# Patient Record
Sex: MALE | Race: WHITE | Employment: UNEMPLOYED | URBAN - METROPOLITAN AREA
[De-identification: names, ages, dates, MRNs, and addresses within clinical notes are randomized per-mention and may not be internally consistent; named-entity substitution may affect disease eponyms.]

---

## 2017-09-26 PROBLEM — F41.9 ANXIETY: Status: ACTIVE | Noted: 2017-09-26

## 2017-09-26 PROBLEM — E78.5 HLD (HYPERLIPIDEMIA): Status: ACTIVE | Noted: 2017-09-26

## 2017-09-26 PROBLEM — I10 HTN (HYPERTENSION): Status: ACTIVE | Noted: 2017-09-26

## 2017-09-26 PROBLEM — G89.29 CHRONIC PAIN: Status: ACTIVE | Noted: 2017-09-26

## 2017-09-26 PROBLEM — R07.9 CHEST PAIN: Status: ACTIVE | Noted: 2017-09-26

## 2017-09-26 PROBLEM — E44.0 MODERATE MALNUTRITION (HCC): Chronic | Status: ACTIVE | Noted: 2017-09-26

## 2017-09-27 ENCOUNTER — OFFICE VISIT (OUTPATIENT)
Dept: FAMILY MEDICINE CLINIC | Age: 62
End: 2017-09-27

## 2017-09-27 VITALS
RESPIRATION RATE: 14 BRPM | BODY MASS INDEX: 21.53 KG/M2 | HEIGHT: 66 IN | DIASTOLIC BLOOD PRESSURE: 70 MMHG | SYSTOLIC BLOOD PRESSURE: 124 MMHG | OXYGEN SATURATION: 94 % | WEIGHT: 134 LBS | HEART RATE: 68 BPM

## 2017-09-27 DIAGNOSIS — Z12.5 PROSTATE CANCER SCREENING: Primary | ICD-10-CM

## 2017-09-27 DIAGNOSIS — G89.29 OTHER CHRONIC PAIN: ICD-10-CM

## 2017-09-27 DIAGNOSIS — I10 ESSENTIAL HYPERTENSION: ICD-10-CM

## 2017-09-27 DIAGNOSIS — K40.90 LEFT INGUINAL HERNIA: ICD-10-CM

## 2017-09-27 DIAGNOSIS — F41.9 ANXIETY: ICD-10-CM

## 2017-09-27 PROCEDURE — 96160 PT-FOCUSED HLTH RISK ASSMT: CPT | Performed by: FAMILY MEDICINE

## 2017-09-27 PROCEDURE — 99203 OFFICE O/P NEW LOW 30 MIN: CPT | Performed by: FAMILY MEDICINE

## 2017-09-27 ASSESSMENT — PATIENT HEALTH QUESTIONNAIRE - PHQ9
4. FEELING TIRED OR HAVING LITTLE ENERGY: 3
6. FEELING BAD ABOUT YOURSELF - OR THAT YOU ARE A FAILURE OR HAVE LET YOURSELF OR YOUR FAMILY DOWN: 3
5. POOR APPETITE OR OVEREATING: 3
1. LITTLE INTEREST OR PLEASURE IN DOING THINGS: 2
8. MOVING OR SPEAKING SO SLOWLY THAT OTHER PEOPLE COULD HAVE NOTICED. OR THE OPPOSITE, BEING SO FIGETY OR RESTLESS THAT YOU HAVE BEEN MOVING AROUND A LOT MORE THAN USUAL: 0
7. TROUBLE CONCENTRATING ON THINGS, SUCH AS READING THE NEWSPAPER OR WATCHING TELEVISION: 3
9. THOUGHTS THAT YOU WOULD BE BETTER OFF DEAD, OR OF HURTING YOURSELF: 0
3. TROUBLE FALLING OR STAYING ASLEEP: 3
SUM OF ALL RESPONSES TO PHQ9 QUESTIONS 1 & 2: 4
10. IF YOU CHECKED OFF ANY PROBLEMS, HOW DIFFICULT HAVE THESE PROBLEMS MADE IT FOR YOU TO DO YOUR WORK, TAKE CARE OF THINGS AT HOME, OR GET ALONG WITH OTHER PEOPLE: 0
2. FEELING DOWN, DEPRESSED OR HOPELESS: 2
SUM OF ALL RESPONSES TO PHQ QUESTIONS 1-9: 19

## 2017-09-27 ASSESSMENT — ENCOUNTER SYMPTOMS: ABDOMINAL PAIN: 0

## 2017-09-28 DIAGNOSIS — I10 ESSENTIAL HYPERTENSION: ICD-10-CM

## 2017-09-28 DIAGNOSIS — Z12.5 PROSTATE CANCER SCREENING: ICD-10-CM

## 2017-09-28 LAB
A/G RATIO: 1.5 (ref 1.1–2.2)
ALBUMIN SERPL-MCNC: 4.1 G/DL (ref 3.4–5)
ALP BLD-CCNC: 68 U/L (ref 40–129)
ALT SERPL-CCNC: 33 U/L (ref 10–40)
ANION GAP SERPL CALCULATED.3IONS-SCNC: 13 MMOL/L (ref 3–16)
AST SERPL-CCNC: 27 U/L (ref 15–37)
BASOPHILS ABSOLUTE: 0 K/UL (ref 0–0.2)
BASOPHILS RELATIVE PERCENT: 0.4 %
BILIRUB SERPL-MCNC: 0.4 MG/DL (ref 0–1)
BUN BLDV-MCNC: 12 MG/DL (ref 7–20)
CALCIUM SERPL-MCNC: 9.3 MG/DL (ref 8.3–10.6)
CHLORIDE BLD-SCNC: 96 MMOL/L (ref 99–110)
CHOLESTEROL, TOTAL: 186 MG/DL (ref 0–199)
CO2: 28 MMOL/L (ref 21–32)
CREAT SERPL-MCNC: 0.9 MG/DL (ref 0.8–1.3)
EOSINOPHILS ABSOLUTE: 0.3 K/UL (ref 0–0.6)
EOSINOPHILS RELATIVE PERCENT: 3.6 %
GFR AFRICAN AMERICAN: >60
GFR NON-AFRICAN AMERICAN: >60
GLOBULIN: 2.7 G/DL
GLUCOSE BLD-MCNC: 94 MG/DL (ref 70–99)
HCT VFR BLD CALC: 45.8 % (ref 40.5–52.5)
HDLC SERPL-MCNC: 54 MG/DL (ref 40–60)
HEMOGLOBIN: 14.9 G/DL (ref 13.5–17.5)
LDL CHOLESTEROL CALCULATED: 108 MG/DL
LYMPHOCYTES ABSOLUTE: 2 K/UL (ref 1–5.1)
LYMPHOCYTES RELATIVE PERCENT: 25.8 %
MAGNESIUM: 2.2 MG/DL (ref 1.8–2.4)
MCH RBC QN AUTO: 29.5 PG (ref 26–34)
MCHC RBC AUTO-ENTMCNC: 32.4 G/DL (ref 31–36)
MCV RBC AUTO: 91 FL (ref 80–100)
MONOCYTES ABSOLUTE: 1.1 K/UL (ref 0–1.3)
MONOCYTES RELATIVE PERCENT: 13.6 %
NEUTROPHILS ABSOLUTE: 4.4 K/UL (ref 1.7–7.7)
NEUTROPHILS RELATIVE PERCENT: 56.6 %
PDW BLD-RTO: 13.1 % (ref 12.4–15.4)
PLATELET # BLD: 193 K/UL (ref 135–450)
PMV BLD AUTO: 9 FL (ref 5–10.5)
POTASSIUM SERPL-SCNC: 4.5 MMOL/L (ref 3.5–5.1)
PROSTATE SPECIFIC ANTIGEN: 0.58 NG/ML (ref 0–4)
RBC # BLD: 5.04 M/UL (ref 4.2–5.9)
SODIUM BLD-SCNC: 137 MMOL/L (ref 136–145)
T3 FREE: 2.7 PG/ML (ref 2.3–4.2)
T4 FREE: 1 NG/DL (ref 0.9–1.8)
TOTAL PROTEIN: 6.8 G/DL (ref 6.4–8.2)
TRIGL SERPL-MCNC: 118 MG/DL (ref 0–150)
TSH SERPL DL<=0.05 MIU/L-ACNC: 1.77 UIU/ML (ref 0.27–4.2)
VITAMIN D 25-HYDROXY: 37.4 NG/ML
VLDLC SERPL CALC-MCNC: 24 MG/DL
WBC # BLD: 7.8 K/UL (ref 4–11)

## 2017-10-03 ENCOUNTER — SURG/PROC ORDERS (OUTPATIENT)
Dept: SURGERY | Age: 62
End: 2017-10-03

## 2017-10-03 ENCOUNTER — INITIAL CONSULT (OUTPATIENT)
Dept: SURGERY | Age: 62
End: 2017-10-03

## 2017-10-03 VITALS
DIASTOLIC BLOOD PRESSURE: 86 MMHG | HEIGHT: 66 IN | BODY MASS INDEX: 21.69 KG/M2 | WEIGHT: 135 LBS | SYSTOLIC BLOOD PRESSURE: 132 MMHG

## 2017-10-03 DIAGNOSIS — K40.20 BILATERAL INGUINAL HERNIA WITHOUT OBSTRUCTION OR GANGRENE, RECURRENCE NOT SPECIFIED: Primary | ICD-10-CM

## 2017-10-03 PROCEDURE — 99242 OFF/OP CONSLTJ NEW/EST SF 20: CPT | Performed by: SURGERY

## 2017-10-03 RX ORDER — HEPARIN SODIUM 5000 [USP'U]/ML
5000 INJECTION, SOLUTION INTRAVENOUS; SUBCUTANEOUS ONCE
Status: CANCELLED | OUTPATIENT
Start: 2017-10-03

## 2017-10-03 RX ORDER — SODIUM CHLORIDE 0.9 % (FLUSH) 0.9 %
10 SYRINGE (ML) INJECTION EVERY 12 HOURS SCHEDULED
Status: CANCELLED | OUTPATIENT
Start: 2017-10-03

## 2017-10-03 RX ORDER — SODIUM CHLORIDE 0.9 % (FLUSH) 0.9 %
10 SYRINGE (ML) INJECTION PRN
Status: CANCELLED | OUTPATIENT
Start: 2017-10-03

## 2017-10-03 NOTE — MR AVS SNAPSHOT
After Visit Summary             Helen Cooley   10/3/2017 10:15 AM   Initial consult    Description:  Male : 1955   Provider:  Bon Rodriguez MD   Department:  [facility]              Your Follow-Up and Future Appointments         Below is a list of your follow-up and future appointments. This may not be a complete list as you may have made appointments directly with providers that we are not aware of or your providers may have made some for you. Please call your providers to confirm appointments. It is important to keep your appointments. Please bring your current insurance card, photo ID, co-pay, and all medication bottles to your appointment. If self-pay, payment is expected at the time of service. Your To-Do List     Future Appointments Provider Department Dept Phone    10/11/2017 2:40 PM Maile Lucas [facility] [address] [phone]    If this is a sports or school physical please bring the physical form with you. Information from Your Visit        Department     Name Address Phone Fax    [facility]IV[facility] [address] [place] [address] [phone]      Vital Signs     Blood Pressure Height Weight Body Mass Index Smoking Status       132/86 5' 6.14\" (1.68 m) 135 lb (61.2 kg) 21.7 kg/m2 Current Every Day Smoker          Medications and Orders      Your Current Medications Are              vitamin D (CHOLECALCIFEROL) 1000 UNIT TABS tablet Take 1,000 Units by mouth daily    aspirin 81 MG chewable tablet Take 81 mg by mouth daily    diazepam (VALIUM) 5 MG tablet Take 5 mg by mouth 3 times daily    carvedilol (COREG) 6.25 MG tablet Take 6.25 mg by mouth 2 times daily    Omega-3 Fatty Acids (OMEGA-3 FISH OIL) 1000 MG CAPS Take 2 g by mouth daily    oxyCODONE (ROXICODONE) 5 MG immediate release tablet Take 5 mg by mouth 4 times daily .

## 2017-10-06 NOTE — PRE-PROCEDURE INSTRUCTIONS

## 2017-10-06 NOTE — PROGRESS NOTES
Obstructive Sleep Apnea (MELANIE) Screening     Patient:  Lisbeth Oshea    YOB: 1955      Medical Record #:  [id number]                     Date:  10/6/2017     1. Are you a loud and/or regular snorer? []  Yes       [x] No    2. Have you been observed to gasp or stop breathing during sleep? []  Yes       [x] No    3. Do you feel tired or groggy upon awakening or do you awaken with a headache?           []  Yes       [x] No    4. Are you often tired or fatigued during the wake time hours? []  Yes       [x] No    5. Do you fall asleep sitting, reading, watching TV or driving? []  Yes       [x] No    6. Do you often have problems with memory or concentration? []  Yes       [x] No    **If patient's score is ? 3 they are considered high risk for MELANIE. Notify the anesthesiologist of the high risk and document in focus note. Note:  If the patient's BMI is more than 35 kg m¯² , has neck circumference > 40 cm, and/or high blood pressure the risk is greater (© American Sleep Apnea Association, 2006).

## 2017-10-10 ENCOUNTER — HOSPITAL ENCOUNTER (OUTPATIENT)
Dept: SURGERY | Age: 62
Discharge: HOME | End: 2017-10-10
Attending: SURGERY | Admitting: SURGERY

## 2017-10-10 VITALS
HEIGHT: 66 IN | SYSTOLIC BLOOD PRESSURE: 164 MMHG | OXYGEN SATURATION: 96 % | WEIGHT: 135 LBS | RESPIRATION RATE: 16 BRPM | DIASTOLIC BLOOD PRESSURE: 78 MMHG | TEMPERATURE: 97.8 F | HEART RATE: 88 BPM | BODY MASS INDEX: 21.69 KG/M2

## 2017-10-10 PROCEDURE — 49650 LAP ING HERNIA REPAIR INIT: CPT | Performed by: SURGERY

## 2017-10-10 RX ORDER — HYDROMORPHONE HCL 110MG/55ML
0.25 PATIENT CONTROLLED ANALGESIA SYRINGE INTRAVENOUS EVERY 5 MIN PRN
Status: DISCONTINUED | OUTPATIENT
Start: 2017-10-10 | End: 2017-10-11 | Stop reason: HOSPADM

## 2017-10-10 RX ORDER — HYDROMORPHONE HCL 110MG/55ML
0.5 PATIENT CONTROLLED ANALGESIA SYRINGE INTRAVENOUS EVERY 5 MIN PRN
Status: COMPLETED | OUTPATIENT
Start: 2017-10-10 | End: 2017-10-10

## 2017-10-10 RX ORDER — HYDRALAZINE HYDROCHLORIDE 20 MG/ML
5 INJECTION INTRAMUSCULAR; INTRAVENOUS EVERY 10 MIN PRN
Status: DISCONTINUED | OUTPATIENT
Start: 2017-10-10 | End: 2017-10-11 | Stop reason: HOSPADM

## 2017-10-10 RX ORDER — METOCLOPRAMIDE HYDROCHLORIDE 5 MG/ML
10 INJECTION INTRAMUSCULAR; INTRAVENOUS
Status: ACTIVE | OUTPATIENT
Start: 2017-10-10 | End: 2017-10-10

## 2017-10-10 RX ORDER — OXYCODONE HYDROCHLORIDE 5 MG/1
5 TABLET ORAL PRN
Status: COMPLETED | OUTPATIENT
Start: 2017-10-10 | End: 2017-10-10

## 2017-10-10 RX ORDER — SODIUM CHLORIDE, SODIUM LACTATE, POTASSIUM CHLORIDE, CALCIUM CHLORIDE 600; 310; 30; 20 MG/100ML; MG/100ML; MG/100ML; MG/100ML
INJECTION, SOLUTION INTRAVENOUS CONTINUOUS
Status: DISCONTINUED | OUTPATIENT
Start: 2017-10-10 | End: 2017-10-11 | Stop reason: HOSPADM

## 2017-10-10 RX ORDER — MORPHINE SULFATE 10 MG/ML
1 INJECTION, SOLUTION INTRAMUSCULAR; INTRAVENOUS EVERY 5 MIN PRN
Status: DISCONTINUED | OUTPATIENT
Start: 2017-10-10 | End: 2017-10-11 | Stop reason: HOSPADM

## 2017-10-10 RX ORDER — LABETALOL HYDROCHLORIDE 5 MG/ML
5 INJECTION, SOLUTION INTRAVENOUS EVERY 10 MIN PRN
Status: DISCONTINUED | OUTPATIENT
Start: 2017-10-10 | End: 2017-10-11 | Stop reason: HOSPADM

## 2017-10-10 RX ORDER — MEPERIDINE HYDROCHLORIDE 25 MG/ML
12.5 INJECTION INTRAMUSCULAR; INTRAVENOUS; SUBCUTANEOUS EVERY 5 MIN PRN
Status: DISCONTINUED | OUTPATIENT
Start: 2017-10-10 | End: 2017-10-11 | Stop reason: HOSPADM

## 2017-10-10 RX ORDER — HYDROMORPHONE HCL 110MG/55ML
0.5 PATIENT CONTROLLED ANALGESIA SYRINGE INTRAVENOUS EVERY 5 MIN PRN
Status: DISCONTINUED | OUTPATIENT
Start: 2017-10-10 | End: 2017-10-11 | Stop reason: HOSPADM

## 2017-10-10 RX ORDER — OXYCODONE HYDROCHLORIDE 5 MG/1
10 TABLET ORAL PRN
Status: COMPLETED | OUTPATIENT
Start: 2017-10-10 | End: 2017-10-10

## 2017-10-10 RX ORDER — OXYCODONE HYDROCHLORIDE 5 MG/1
5-10 TABLET ORAL EVERY 6 HOURS PRN
Qty: 28 TABLET | Refills: 0 | Status: SHIPPED | OUTPATIENT
Start: 2017-10-10 | End: 2017-10-16 | Stop reason: SDUPTHER

## 2017-10-10 RX ORDER — HEPARIN SODIUM 5000 [USP'U]/ML
5000 INJECTION, SOLUTION INTRAVENOUS; SUBCUTANEOUS ONCE
Status: COMPLETED | OUTPATIENT
Start: 2017-10-10 | End: 2017-10-10

## 2017-10-10 RX ORDER — PROMETHAZINE HYDROCHLORIDE 25 MG/ML
6.25 INJECTION, SOLUTION INTRAMUSCULAR; INTRAVENOUS
Status: ACTIVE | OUTPATIENT
Start: 2017-10-10 | End: 2017-10-10

## 2017-10-10 RX ORDER — DIPHENHYDRAMINE HYDROCHLORIDE 50 MG/ML
12.5 INJECTION INTRAMUSCULAR; INTRAVENOUS
Status: ACTIVE | OUTPATIENT
Start: 2017-10-10 | End: 2017-10-10

## 2017-10-10 RX ORDER — SODIUM CHLORIDE 0.9 % (FLUSH) 0.9 %
10 SYRINGE (ML) INJECTION PRN
Status: DISCONTINUED | OUTPATIENT
Start: 2017-10-10 | End: 2017-10-11 | Stop reason: HOSPADM

## 2017-10-10 RX ORDER — LIDOCAINE HYDROCHLORIDE 10 MG/ML
1 INJECTION, SOLUTION EPIDURAL; INFILTRATION; INTRACAUDAL; PERINEURAL
Status: COMPLETED | OUTPATIENT
Start: 2017-10-10 | End: 2017-10-10

## 2017-10-10 RX ORDER — SODIUM CHLORIDE, SODIUM LACTATE, POTASSIUM CHLORIDE, CALCIUM CHLORIDE 600; 310; 30; 20 MG/100ML; MG/100ML; MG/100ML; MG/100ML
INJECTION, SOLUTION INTRAVENOUS
Status: DISPENSED
Start: 2017-10-10 | End: 2017-10-10

## 2017-10-10 RX ORDER — SODIUM CHLORIDE 0.9 % (FLUSH) 0.9 %
10 SYRINGE (ML) INJECTION EVERY 12 HOURS SCHEDULED
Status: DISCONTINUED | OUTPATIENT
Start: 2017-10-10 | End: 2017-10-11 | Stop reason: HOSPADM

## 2017-10-10 RX ADMIN — MEPERIDINE HYDROCHLORIDE 12.5 MG: 25 INJECTION INTRAMUSCULAR; INTRAVENOUS; SUBCUTANEOUS at 14:19

## 2017-10-10 RX ADMIN — OXYCODONE HYDROCHLORIDE 10 MG: 5 TABLET ORAL at 15:07

## 2017-10-10 RX ADMIN — Medication 0.5 MG: at 14:39

## 2017-10-10 RX ADMIN — SODIUM CHLORIDE, SODIUM LACTATE, POTASSIUM CHLORIDE, CALCIUM CHLORIDE: 600; 310; 30; 20 INJECTION, SOLUTION INTRAVENOUS at 10:18

## 2017-10-10 RX ADMIN — LIDOCAINE HYDROCHLORIDE 1 ML: 10 INJECTION, SOLUTION EPIDURAL; INFILTRATION; INTRACAUDAL; PERINEURAL at 10:16

## 2017-10-10 RX ADMIN — MEPERIDINE HYDROCHLORIDE 12.5 MG: 25 INJECTION INTRAMUSCULAR; INTRAVENOUS; SUBCUTANEOUS at 14:10

## 2017-10-10 RX ADMIN — HEPARIN SODIUM 5000 UNITS: 5000 INJECTION, SOLUTION INTRAVENOUS; SUBCUTANEOUS at 10:15

## 2017-10-10 RX ADMIN — Medication 0.5 MG: at 14:10

## 2017-10-10 RX ADMIN — Medication 0.5 MG: at 14:48

## 2017-10-10 RX ADMIN — Medication 0.5 MG: at 14:33

## 2017-10-10 ASSESSMENT — PAIN DESCRIPTION - ONSET
ONSET: ON-GOING
ONSET: ON-GOING

## 2017-10-10 ASSESSMENT — PAIN DESCRIPTION - DESCRIPTORS
DESCRIPTORS: CONSTANT

## 2017-10-10 ASSESSMENT — PAIN SCALES - GENERAL
PAINLEVEL_OUTOF10: 10
PAINLEVEL_OUTOF10: 7
PAINLEVEL_OUTOF10: 10
PAINLEVEL_OUTOF10: 3
PAINLEVEL_OUTOF10: 7

## 2017-10-10 ASSESSMENT — PAIN - FUNCTIONAL ASSESSMENT
PAIN_FUNCTIONAL_ASSESSMENT: 0-10

## 2017-10-10 ASSESSMENT — PAIN DESCRIPTION - LOCATION
LOCATION: ABDOMEN
LOCATION: ABDOMEN

## 2017-10-10 ASSESSMENT — PAIN DESCRIPTION - PAIN TYPE
TYPE: SURGICAL PAIN
TYPE: SURGICAL PAIN

## 2017-10-10 ASSESSMENT — PAIN DESCRIPTION - ORIENTATION
ORIENTATION: MID
ORIENTATION: MID

## 2017-10-10 ASSESSMENT — PAIN DESCRIPTION - FREQUENCY
FREQUENCY: CONTINUOUS
FREQUENCY: CONTINUOUS

## 2017-10-10 NOTE — PLAN OF CARE
process. 23. Patient pain level is established preoperatively using age appropriate pain scale. 24. The patient will move to fall risk upon sedation- during and through the recovery phase. Interventions- orient the patient to the environment, especially the location of the bathroom; provide treaded socks/non-skid footwear; demonstrate and teach back use of the nurse's call system; instruct the patient to call for help before getting out of bed; lock all movable equipment before transferring patient; keep bed in lowest position possible.  25. Other:  Alex Montez RN

## 2017-10-10 NOTE — PLAN OF CARE
Phase II:  1. Patient is identified using name and the date of birth. 2.  The patient is free from signs and symptoms of chemical, electrical, laser, radiation, positioning, or transfer/transport injury. 3.  The patient receives appropriate medication(s), safely administered during the Perioperative period. 4.  The patient has wound/tissue perfusion consistent with or improved from baseline levels established preoperatively. 5.  The patient is at or returning to normothermia at the conclusion of the immediate postoperative period. 6.  The patient's fluid, electrolyte, and acid base balances are consistent with or improved from baseline levels established preoperatively. 7.  The patient's pulmonary function is consistent with or improved from baseline levels established preoperatively. 8.  The patient's cardiovascular status is consistent with or improved from baseline levels established preoperatively. 9.  The patient/caregiver demonstrates knowledge of nutritional management related to the operative or other invasive procedure. 10. The patient/caregiver demonstrates knowledge of medication, pain, and wound management. 11. The patient participates in the rehabilitation process as applicable. 12.  The patient/caregiver participates in decisions affection his or her Perioperative plan of care. 13.  The patient's care is consistent with the individualized Perioperative plan of care. 14.  The patient's right to privacy is maintained. 15. The patient is the recipient of competent and ethical care within legal standards of practice. 16.  The patient's value system, lifestyle, ethnicity, and culture are considered, respected, and incorporated in the Perioperative plan of care and understands special services available. 17.  The patient demonstrates and/or reports adequate pain control throughout the the Perioperative period. 18.   The patient's neurological status is consistent with or improved from baseline levels established preoperatively. 23.  The patient/caregiver demonstrates knowledge of the expected responses to the operative or invasive procedure. 20.  Patient/Caregiver has reduced anxiety. Interventions- Familiarize with environment and equipment.   21. Other:  22. Other:

## 2017-10-10 NOTE — PLAN OF CARE
Phase I (PACU)  1. Patient is identified using name and the date of birth. 2.  The patient is free from signs and symptoms of chemical, electrical, laser, radiation, positioning, or transfer/transport injury. 3.  The patient receives appropriate medication(s), safely administered during the Perioperative period. 4.  The patient has wound/tissue perfusion consistent with or improved from baseline levels established preoperatively. 5.  The patient is at or returning to normothermia at the conclusion of the immediate postoperative period. 6.  The patient's fluid, electrolyte, and acid base balances are consistent with or improved from baseline levels established preoperatively. 7.  The patient's pulmonary function is consistent with or improved from baseline levels established preoperatively. 8.  The patient's cardiovascular status is consistent with or improved from baseline levels established preoperatively. 9.  The patient/caregiver participates in decisions affecting his or her Perioperative plan of care. 10. The patient's care is consistent with the individualized Perioperative plan of care. 11. The patient's right to privacy is maintained. 12. The patient is the recipient of competent and ethical care within legal standards of practice. 13.  The patient's value system, lifestyle, ethnicity, and culture are considered, respected, and incorporated in the Perioperative plan of care. 14.  The patient demonstrates and/or reports adequate pain control throughout the the Perioperative period. 15. The patient's neurological status is consistent with or improved from baseline levels established preoperatively. 16.  The patient/caregiver demonstrates knowledge of the expected responses to the operative or invasive procedure. 17.  Patient/Caregiver has reduced anxiety. Interventions- Familiarize with environment and equipment.   18.  Other:  19.Other:

## 2017-10-10 NOTE — ANESTHESIA PRE-OP
Procedure Laterality Date    BACK SURGERY      CARDIOVASCULAR STRESS TEST  09/26/2017       Social History:    Social History   Substance Use Topics    Smoking status: Current Every Day Smoker     Packs/day: 1.00    Smokeless tobacco: Never Used    Alcohol use Yes      Comment: 2-3 beers a day                                Ready to quit: Not Answered  Counseling given: Not Answered      Vital Signs (Current):   Vitals:    10/06/17 0923 10/10/17 1004   BP:  (!) 157/91   Pulse:  66   Resp:  16   Temp:  98.8 °F (37.1 °C)   TempSrc:  Temporal   SpO2:  98%   Weight: 135 lb (61.2 kg)    Height: 5' 6\" (1.676 m)                                               BP Readings from Last 3 Encounters:   10/10/17 (!) 157/91   10/03/17 132/86   09/27/17 124/70       NPO Status: Time of last liquid consumption: 0000                        Time of last solid consumption: 0000                        Date of last liquid consumption: 10/09/17                        Date of last solid food consumption: 10/09/17    BMI:   Wt Readings from Last 3 Encounters:   10/06/17 135 lb (61.2 kg)   10/03/17 135 lb (61.2 kg)   09/27/17 134 lb (60.8 kg)     Body mass index is 21.79 kg/m². Anesthesia Evaluation  Patient summary reviewed and Nursing notes reviewed no history of anesthetic complications:   Airway: Mallampati: II  TM distance: >3 FB   Neck ROM: full  Mouth opening: > = 3 FB Dental:          Pulmonary:negative ROS                              Cardiovascular:    (+) hypertension:,                   Neuro/Psych:   (+) psychiatric history:            GI/Hepatic/Renal: neg ROS  (+) liver disease:,           Endo/Other: negative ROS                    Abdominal:           Vascular:                                      Anesthesia Plan      general     ASA 3    (61-year-old male presents for robotically-assisted laparoscopic bilateral inguinal hernia repair. Plan general anesthesia with ASA standard monitors. Questions answered. Patient agreeable with anesthetic plan.  )  Induction: intravenous. Anesthetic plan and risks discussed with patient. Plan discussed with CRNA.             Norberto Collins MD   10/10/2017

## 2017-10-10 NOTE — OP NOTE
sterile  dressings. All sponge, needle, and instrument counts were correct at  the end of the case. The patient tolerated the procedure well and was  taken to the recovery area in stable condition.         Jose Elias Dickens MD    D: 10/10/2017 16:12:07       T: 10/10/2017 18:03:54     [email or web address]  Job#: [id number]     Doc#: [id number]    CC:  Dr. Rea Joseph

## 2017-10-16 ENCOUNTER — TELEPHONE (OUTPATIENT)
Dept: SURGERY | Age: 62
End: 2017-10-16

## 2017-10-16 RX ORDER — OXYCODONE HYDROCHLORIDE 5 MG/1
5-10 TABLET ORAL EVERY 6 HOURS PRN
Qty: 28 TABLET | Refills: 0 | Status: SHIPPED | OUTPATIENT
Start: 2017-10-16 | End: 2017-10-20 | Stop reason: SDUPTHER

## 2017-10-20 ENCOUNTER — TELEPHONE (OUTPATIENT)
Dept: SURGERY | Age: 62
End: 2017-10-20

## 2017-10-20 RX ORDER — OXYCODONE HYDROCHLORIDE 5 MG/1
5-10 TABLET ORAL EVERY 6 HOURS PRN
Qty: 28 TABLET | Refills: 0 | Status: SHIPPED | OUTPATIENT
Start: 2017-10-20 | End: 2018-03-21 | Stop reason: ALTCHOICE

## 2017-10-20 NOTE — TELEPHONE ENCOUNTER
Pt called he needs a refill on pain medication, and also he has noticed dark colored/foul smelling urine, I advised to drink more water, does he need a U/A? Using [facility].

## 2017-10-26 ENCOUNTER — OFFICE VISIT (OUTPATIENT)
Dept: FAMILY MEDICINE CLINIC | Age: 62
End: 2017-10-26

## 2017-10-26 VITALS
WEIGHT: 133 LBS | BODY MASS INDEX: 21.38 KG/M2 | HEIGHT: 66 IN | SYSTOLIC BLOOD PRESSURE: 142 MMHG | RESPIRATION RATE: 14 BRPM | DIASTOLIC BLOOD PRESSURE: 94 MMHG | OXYGEN SATURATION: 97 % | HEART RATE: 89 BPM

## 2017-10-26 DIAGNOSIS — Z87.19 STATUS POST BILATERAL INGUINAL HERNIA REPAIR: Primary | ICD-10-CM

## 2017-10-26 DIAGNOSIS — Z98.890 STATUS POST BILATERAL INGUINAL HERNIA REPAIR: Primary | ICD-10-CM

## 2017-10-26 PROCEDURE — 99396 PREV VISIT EST AGE 40-64: CPT | Performed by: FAMILY MEDICINE

## 2017-10-26 ASSESSMENT — ENCOUNTER SYMPTOMS: COLOR CHANGE: 1

## 2017-10-26 NOTE — PROGRESS NOTES
10/26/2017    This is a 63 y.o. male   Chief Complaint   Patient presents with    Follow Up After Procedure     Had double hernia surgery 10/10/2017 Very sore from surgery. Maycol ALVAREZ  Patient was originally scheduled for physical exam today, but had a double hernia surgery on October 10, 2017 and is very sore. Patient would like to have the surgery sites examined today. Will reschedule the physical exam.    States that today is the best day he has had since surgery and today is the first day he has driven since the surgery. Was told by Dr. Zoe Muniz that as long as he sees me that he doesn't need to return for a follow-up. Was told to take it easy for 6 weeks and that the gas should slowly work its way out from his abdomen. Pt admits to a lot of recent stress. Past Medical History:   Diagnosis Date    Chronic back pain     Depression     Hyperlipidemia     Hypertension     Liver disease        Past Surgical History:   Procedure Laterality Date    BACK SURGERY      CARDIOVASCULAR STRESS TEST  09/26/2017    HERNIA REPAIR  10/10/2017     ROBOTIC BILATERAL INGUINAL HERNIA REPAIR WITH MESH       Social History     Social History    Marital status: Legally      Spouse name: N/A    Number of children: N/A    Years of education: N/A     Occupational History    Not on file. Social History Main Topics    Smoking status: Current Every Day Smoker     Packs/day: 1.00    Smokeless tobacco: Never Used    Alcohol use Yes      Comment: 2-3 beers a day    Drug use: No    Sexual activity: Not Currently     Other Topics Concern    Not on file     Social History Narrative    No narrative on file       History reviewed. No pertinent family history. Current Outpatient Prescriptions   Medication Sig Dispense Refill    oxyCODONE (ROXICODONE) 5 MG immediate release tablet Take 1-2 tablets by mouth every 6 hours as needed for Pain .  28 tablet 0    vitamin D (CHOLECALCIFEROL) 1000 UNIT TABS tablet Take 09/28/2017 0.3  0.0 - 0.6 K/uL Final    Basophils # 09/28/2017 0.0  0.0 - 0.2 K/uL Final    Sodium 09/28/2017 137  136 - 145 mmol/L Final    Potassium 09/28/2017 4.5  3.5 - 5.1 mmol/L Final    Chloride 09/28/2017 96* 99 - 110 mmol/L Final    CO2 09/28/2017 28  21 - 32 mmol/L Final    Anion Gap 09/28/2017 13  3 - 16 Final    Glucose 09/28/2017 94  70 - 99 mg/dL Final    BUN 09/28/2017 12  7 - 20 mg/dL Final    CREATININE 09/28/2017 0.9  0.8 - 1.3 mg/dL Final    GFR Non- 09/28/2017 >60  >60 Final    Comment: >60 mL/min/1.73m2 EGFR, calc. for ages 25 and older using the  MDRD formula (not corrected for weight), is valid for stable  renal function.  GFR  09/28/2017 >60  >60 Final    Comment: Chronic Kidney Disease: less than 60 ml/min/1.73 sq.m. Kidney Failure: less than 15 ml/min/1.73 sq.m. Results valid for patients 18 years and older.  Calcium 09/28/2017 9.3  8.3 - 10.6 mg/dL Final    Total Protein 09/28/2017 6.8  6.4 - 8.2 g/dL Final    Alb 09/28/2017 4.1  3.4 - 5.0 g/dL Final    Albumin/Globulin Ratio 09/28/2017 1.5  1.1 - 2.2 Final    Total Bilirubin 09/28/2017 0.4  0.0 - 1.0 mg/dL Final    Alkaline Phosphatase 09/28/2017 68  40 - 129 U/L Final    ALT 09/28/2017 33  10 - 40 U/L Final    AST 09/28/2017 27  15 - 37 U/L Final    Globulin 09/28/2017 2.7  g/dL Final    Vit D, 25-Hydroxy 09/28/2017 37.4  >=30 ng/mL Final    Comment: <=20 ng/mL. ........... Valaria Ding Deficient  21-29 ng/mL. ......... Valaria Ding Insufficient  >=30 ng/mL. ........ Valaria Ding Sufficient      Magnesium 09/28/2017 2.20  1.80 - 2.40 mg/dL Final    PSA 09/28/2017 0.58  0.00 - 4.00 ng/mL Final       Review of Systems   Constitutional: Positive for chills (some). Negative for fatigue and fever. Gastrointestinal:        Bilateral inguinal pain from surgery - improved   Genitourinary: Negative for difficulty urinating (previously had issues after surgery).    Musculoskeletal: Positive for myalgias (bilateral daily Yes Historical Provider, MD

## 2017-11-02 ENCOUNTER — TELEPHONE (OUTPATIENT)
Dept: FAMILY MEDICINE CLINIC | Age: 62
End: 2017-11-02

## 2017-11-02 DIAGNOSIS — H21.562 PUPIL IRREGULARITY, LEFT: Primary | ICD-10-CM

## 2017-11-09 ENCOUNTER — OFFICE VISIT (OUTPATIENT)
Dept: FAMILY MEDICINE CLINIC | Age: 62
End: 2017-11-09

## 2017-11-09 VITALS
RESPIRATION RATE: 16 BRPM | BODY MASS INDEX: 20.96 KG/M2 | HEIGHT: 66 IN | DIASTOLIC BLOOD PRESSURE: 86 MMHG | HEART RATE: 83 BPM | OXYGEN SATURATION: 94 % | SYSTOLIC BLOOD PRESSURE: 137 MMHG | TEMPERATURE: 97.3 F | WEIGHT: 130.4 LBS

## 2017-11-09 DIAGNOSIS — I10 ESSENTIAL HYPERTENSION: Primary | ICD-10-CM

## 2017-11-09 PROCEDURE — 4004F PT TOBACCO SCREEN RCVD TLK: CPT | Performed by: FAMILY MEDICINE

## 2017-11-09 PROCEDURE — 99213 OFFICE O/P EST LOW 20 MIN: CPT | Performed by: FAMILY MEDICINE

## 2017-11-09 PROCEDURE — 3017F COLORECTAL CA SCREEN DOC REV: CPT | Performed by: FAMILY MEDICINE

## 2017-11-09 PROCEDURE — G8427 DOCREV CUR MEDS BY ELIG CLIN: HCPCS | Performed by: FAMILY MEDICINE

## 2017-11-09 PROCEDURE — G8484 FLU IMMUNIZE NO ADMIN: HCPCS | Performed by: FAMILY MEDICINE

## 2017-11-09 PROCEDURE — G8420 CALC BMI NORM PARAMETERS: HCPCS | Performed by: FAMILY MEDICINE

## 2017-11-09 RX ORDER — M-VIT,TX,IRON,MINS/CALC/FOLIC 27MG-0.4MG
1 TABLET ORAL DAILY
COMMUNITY

## 2017-11-09 ASSESSMENT — ENCOUNTER SYMPTOMS: ABDOMINAL PAIN: 1

## 2017-11-09 NOTE — PROGRESS NOTES
facility-administered medications for this visit.         Immunization History   Administered Date(s) Administered    Td 01/01/2005       Allergies   Allergen Reactions    No Known Allergies     Statins      AMS  Elevated LFTs       Orders Only on 09/28/2017   Component Date Value Ref Range Status    TSH 09/28/2017 1.77  0.27 - 4.20 uIU/mL Final    T4 Free 09/28/2017 1.0  0.9 - 1.8 ng/dL Final    T3, Free 09/28/2017 2.7  2.3 - 4.2 pg/mL Final    Cholesterol, Total 09/28/2017 186  0 - 199 mg/dL Final    Triglycerides 09/28/2017 118  0 - 150 mg/dL Final    HDL 09/28/2017 54  40 - 60 mg/dL Final    LDL Calculated 09/28/2017 108* <100 mg/dL Final    VLDL CHOLESTEROL CALCULATED 09/28/2017 24  Not Established mg/dL Final    WBC 09/28/2017 7.8  4.0 - 11.0 K/uL Final    RBC 09/28/2017 5.04  4.20 - 5.90 M/uL Final    Hemoglobin 09/28/2017 14.9  13.5 - 17.5 g/dL Final    Hematocrit 09/28/2017 45.8  40.5 - 52.5 % Final    MCV 09/28/2017 91.0  80.0 - 100.0 fL Final    MCH 09/28/2017 29.5  26.0 - 34.0 pg Final    MCHC 09/28/2017 32.4  31.0 - 36.0 g/dL Final    RDW 09/28/2017 13.1  12.4 - 15.4 % Final    Platelets 76/82/6732 193  135 - 450 K/uL Final    MPV 09/28/2017 9.0  5.0 - 10.5 fL Final    Neutrophils % 09/28/2017 56.6  % Final    Lymphocytes % 09/28/2017 25.8  % Final    Monocytes % 09/28/2017 13.6  % Final    Eosinophils % 09/28/2017 3.6  % Final    Basophils % 09/28/2017 0.4  % Final    Neutrophils # 09/28/2017 4.4  1.7 - 7.7 K/uL Final    Lymphocytes # 09/28/2017 2.0  1.0 - 5.1 K/uL Final    Monocytes # 09/28/2017 1.1  0.0 - 1.3 K/uL Final    Eosinophils # 09/28/2017 0.3  0.0 - 0.6 K/uL Final    Basophils # 09/28/2017 0.0  0.0 - 0.2 K/uL Final    Sodium 09/28/2017 137  136 - 145 mmol/L Final    Potassium 09/28/2017 4.5  3.5 - 5.1 mmol/L Final    Chloride 09/28/2017 96* 99 - 110 mmol/L Final    CO2 09/28/2017 28  21 - 32 mmol/L Final    Anion Gap 09/28/2017 13  3 - 16 Final    Glucose 09/28/2017 94  70 - 99 mg/dL Final    BUN 09/28/2017 12  7 - 20 mg/dL Final    CREATININE 09/28/2017 0.9  0.8 - 1.3 mg/dL Final    GFR Non- 09/28/2017 >60  >60 Final    Comment: >60 mL/min/1.73m2 EGFR, calc. for ages 25 and older using the  MDRD formula (not corrected for weight), is valid for stable  renal function.  GFR  09/28/2017 >60  >60 Final    Comment: Chronic Kidney Disease: less than 60 ml/min/1.73 sq.m. Kidney Failure: less than 15 ml/min/1.73 sq.m. Results valid for patients 18 years and older.  Calcium 09/28/2017 9.3  8.3 - 10.6 mg/dL Final    Total Protein 09/28/2017 6.8  6.4 - 8.2 g/dL Final    Alb 09/28/2017 4.1  3.4 - 5.0 g/dL Final    Albumin/Globulin Ratio 09/28/2017 1.5  1.1 - 2.2 Final    Total Bilirubin 09/28/2017 0.4  0.0 - 1.0 mg/dL Final    Alkaline Phosphatase 09/28/2017 68  40 - 129 U/L Final    ALT 09/28/2017 33  10 - 40 U/L Final    AST 09/28/2017 27  15 - 37 U/L Final    Globulin 09/28/2017 2.7  g/dL Final    Vit D, 25-Hydroxy 09/28/2017 37.4  >=30 ng/mL Final    Comment: <=20 ng/mL. ........... Princess Solorio Deficient  21-29 ng/mL. ......... Princess Solorio Insufficient  >=30 ng/mL. ........ Princess Solorio Sufficient      Magnesium 09/28/2017 2.20  1.80 - 2.40 mg/dL Final    PSA 09/28/2017 0.58  0.00 - 4.00 ng/mL Final       Review of Systems   Constitutional: Negative for chills and fever. Gastrointestinal: Positive for abdominal pain (yesterday from his surgery - improving overall). Neurological: Negative for dizziness and headaches. /86 (Site: Right Arm, Position: Sitting, Cuff Size: Medium Adult)   Pulse 83   Temp 97.3 °F (36.3 °C) (Oral)   Resp 16   Ht 5' 6\" (1.676 m)   Wt 130 lb 6.4 oz (59.1 kg)   SpO2 94%   BMI 21.05 kg/m²     Physical Exam   Constitutional: He is oriented to person, place, and time. He appears well-developed and well-nourished. Eyes: EOM are normal. Pupils are equal, round, and reactive to light.    Neck: Normal range of motion. Cardiovascular: Normal rate, regular rhythm and normal heart sounds. Pulmonary/Chest: Effort normal and breath sounds normal.   Abdominal: Soft. Bowel sounds are normal. There is no tenderness. Neurological: He is alert and oriented to person, place, and time. Skin: Skin is warm and dry. Psychiatric: He has a normal mood and affect. His behavior is normal. Judgment and thought content normal.       Plan  1. Essential hypertension  Pt will call former PCP and ask what the unknown medicine he took today is, will then let us know. Pt will return for pre-op physical for CEI on/after 11/27/17  No Follow-up on file. Prior to Visit Medications    Medication Sig Taking? Authorizing Provider   Multiple Vitamins-Minerals (THERAPEUTIC MULTIVITAMIN-MINERALS) tablet Take 1 tablet by mouth daily Yes Historical Provider, MD   oxyCODONE (ROXICODONE) 5 MG immediate release tablet Take 1-2 tablets by mouth every 6 hours as needed for Pain .  Yes Sd Martínez MD   vitamin D (CHOLECALCIFEROL) 1000 UNIT TABS tablet Take 1,000 Units by mouth daily Yes Historical Provider, MD   aspirin 81 MG chewable tablet Take 81 mg by mouth daily Yes Historical Provider, MD   diazepam (VALIUM) 5 MG tablet Take 5 mg by mouth 3 times daily Yes Historical Provider, MD   carvedilol (COREG) 6.25 MG tablet Take 6.25 mg by mouth 2 times daily Yes Historical Provider, MD   Omega-3 Fatty Acids (OMEGA-3 FISH OIL) 1000 MG CAPS Take 2 g by mouth daily Yes Historical Provider, MD

## 2017-12-01 ENCOUNTER — OFFICE VISIT (OUTPATIENT)
Dept: FAMILY MEDICINE CLINIC | Age: 62
End: 2017-12-01

## 2017-12-01 VITALS
OXYGEN SATURATION: 92 % | DIASTOLIC BLOOD PRESSURE: 78 MMHG | BODY MASS INDEX: 21.69 KG/M2 | RESPIRATION RATE: 14 BRPM | HEART RATE: 72 BPM | HEIGHT: 66 IN | WEIGHT: 135 LBS | SYSTOLIC BLOOD PRESSURE: 122 MMHG

## 2017-12-01 DIAGNOSIS — Z01.818 PREOP EXAMINATION: Primary | ICD-10-CM

## 2017-12-01 PROCEDURE — 99243 OFF/OP CNSLTJ NEW/EST LOW 30: CPT | Performed by: FAMILY MEDICINE

## 2017-12-01 NOTE — PROGRESS NOTES
Preoperative Consultation      Renetta Cooley  YOB: 1955    Date of Service:  12/1/2017    Vitals:    12/01/17 1523   Weight: 135 lb (61.2 kg)   Height: 5' 6\" (1.676 m)      Wt Readings from Last 2 Encounters:   12/01/17 135 lb (61.2 kg)   11/09/17 130 lb 6.4 oz (59.1 kg)     BP Readings from Last 3 Encounters:   11/09/17 137/86   10/26/17 (!) 142/94   10/10/17 (!) 164/78        Chief Complaint   Patient presents with    Pre-op Exam     CEI on 12/16/2017 cataracts removed Left eye/Right eye 12/28/17     Allergies   Allergen Reactions    No Known Allergies     Statins      AMS  Elevated LFTs     Outpatient Prescriptions Marked as Taking for the 12/1/17 encounter (Office Visit) with Abril Berrios DO   Medication Sig Dispense Refill    Multiple Vitamins-Minerals (THERAPEUTIC MULTIVITAMIN-MINERALS) tablet Take 1 tablet by mouth daily      oxyCODONE (ROXICODONE) 5 MG immediate release tablet Take 1-2 tablets by mouth every 6 hours as needed for Pain . 28 tablet 0    vitamin D (CHOLECALCIFEROL) 1000 UNIT TABS tablet Take 1,000 Units by mouth daily      aspirin 81 MG chewable tablet Take 81 mg by mouth daily      diazepam (VALIUM) 5 MG tablet Take 5 mg by mouth 3 times daily      carvedilol (COREG) 6.25 MG tablet Take 6.25 mg by mouth 2 times daily      Omega-3 Fatty Acids (OMEGA-3 FISH OIL) 1000 MG CAPS Take 2 g by mouth daily         This patient presents to the office today for a preoperative consultation, pt plans on having Bilateral cataract removal by Dr. Kylee Vega on 12/16/17 and 12/28/17 at [facility]. The current problem began 1 year ago, and symptoms have been worsening with time.   Conservative therapy: No.    Planned anesthesia: Regional   Known anesthesia problems: None   Bleeding risk: No recent or remote history of abnormal bleeding  Personal or FH of DVT/PE: No    Patient objection to receiving blood products: No    Patient Active Problem List   Diagnosis    Chest pain    HTN (hypertension)    HLD (hyperlipidemia)    Anxiety    Chronic pain    Moderate malnutrition (HCC)    Non-recurrent bilateral inguinal hernia without obstruction or gangrene       Past Medical History:   Diagnosis Date    Chronic back pain     Depression     Hyperlipidemia     Hypertension     Liver disease      Past Surgical History:   Procedure Laterality Date    BACK SURGERY      CARDIOVASCULAR STRESS TEST  09/26/2017    HERNIA REPAIR  10/10/2017     ROBOTIC BILATERAL INGUINAL HERNIA REPAIR WITH MESH     History reviewed. No pertinent family history. Social History     Social History    Marital status: Legally      Spouse name: N/A    Number of children: N/A    Years of education: N/A     Occupational History    Not on file. Social History Main Topics    Smoking status: Current Every Day Smoker     Packs/day: 1.00    Smokeless tobacco: Never Used    Alcohol use Yes      Comment: 2-3 beers a day    Drug use: No    Sexual activity: Not Currently     Other Topics Concern    Not on file     Social History Narrative    No narrative on file       Review of Systems  A comprehensive review of systems was negative except for what was noted in the HPI. Physical Exam   Constitutional: He is oriented to person, place, and time. He appears well-developed and well-nourished. No distress. HENT:   Head: Normocephalic and atraumatic. Mouth/Throat: Uvula is midline, oropharynx is clear and moist and mucous membranes are normal.   Eyes: Conjunctivae and EOM are normal. Pupils are equal, round, and reactive to light. Neck: Trachea normal and normal range of motion. Neck supple. No JVD present. Carotid bruit is not present. No mass and no thyromegaly present. Cardiovascular: Normal rate, regular rhythm, normal heart sounds and intact distal pulses. Exam reveals no gallop and no friction rub. No murmur heard. Pulmonary/Chest: Effort normal. No respiratory distress.  Bilateral wheezing- Phosphatase 09/28/2017 68     ALT 09/28/2017 33     AST 09/28/2017 27     Globulin 09/28/2017 2.7     Vit D, 25-Hydroxy 09/28/2017 37.4     Magnesium 09/28/2017 2.20     PSA 09/28/2017 0.58    Admission on 09/25/2017, Discharged on 09/26/2017   Component Date Value    WBC 09/25/2017 6.2     RBC 09/25/2017 4.78     Hemoglobin 09/25/2017 14.3     Hematocrit 09/25/2017 42.3     MCV 09/25/2017 88.6     MCH 09/25/2017 30.0     MCHC 09/25/2017 33.8     RDW 09/25/2017 12.4     Platelets 55/19/4788 175     MPV 09/25/2017 8.6     Neutrophils % 09/25/2017 44.8     Lymphocytes % 09/25/2017 34.4     Monocytes % 09/25/2017 14.4     Eosinophils % 09/25/2017 5.9     Basophils % 09/25/2017 0.5     Neutrophils # 09/25/2017 2.8     Lymphocytes # 09/25/2017 2.1     Monocytes # 09/25/2017 0.9     Eosinophils # 09/25/2017 0.4     Basophils # 09/25/2017 0.0     Sodium 09/25/2017 138     Potassium 09/25/2017 4.6     Chloride 09/25/2017 97*    CO2 09/25/2017 30     Anion Gap 09/25/2017 11     Glucose 09/25/2017 116*    BUN 09/25/2017 12     CREATININE 09/25/2017 1.0     GFR Non- 09/25/2017 >60     GFR  09/25/2017 >60     Calcium 09/25/2017 8.7     Total Protein 09/25/2017 6.4     Alb 09/25/2017 3.9     Albumin/Globulin Ratio 09/25/2017 1.6     Total Bilirubin 09/25/2017 <0.2     Alkaline Phosphatase 09/25/2017 61     ALT 09/25/2017 37     AST 09/25/2017 30     Globulin 09/25/2017 2.5     Protime 09/25/2017 11.8     INR 09/25/2017 1.04     Troponin 09/25/2017 <0.01     Troponin 09/26/2017 <0.01     Troponin 09/26/2017 <0.01     Cholesterol, Total 09/26/2017 146     Triglycerides 09/26/2017 123     HDL 09/26/2017 38*    LDL Calculated 09/26/2017 83     VLDL CHOLESTEROL CALCULA* 09/26/2017 25     Ventricular Rate 09/26/2017 71     Atrial Rate 09/26/2017 71     P-R Interval 09/26/2017 164     QRS Duration 09/26/2017 84     Q-T Interval 09/26/2017 392  QTc Calculation (Bazett) 09/26/2017 425     P Axis 09/26/2017 70     R Axis 09/26/2017 59     T Axis 09/26/2017 60     Diagnosis 09/26/2017                      Value:Normal sinus rhythm with sinus arrhythmia  Septal infarct , age undetermined  Abnormal ECG  When compared with ECG of 20-NOV-2014 15:15,  Septal infarct is now Present  Confirmed by Magdalena Salinas ([id number]) on 9/26/2017 9:44:24 PM             Assessment:       63 y.o. patient with planned surgery as above. Known risk factors for perioperative complications: Hypertension  Current medications which may produce withdrawal symptoms if withheld perioperatively: Valium      Plan:     1. Preoperative workup as follows: lab results reviewed, H&P  2. Change in medication regimen before surgery: Pt will find out on 12/4/17  3. Prophylaxis for cardiac events with perioperative beta-blockers: Not indicated  ACC/AHA indications for pre-operative beta-blocker use:    · Vascular surgery with history of postitive stress test  · Intermediate or high risk surgery with history of CAD   · Intermediate or high risk surgery with multiple clinical predictors of CAD- 2 of the following: history of compensated or prior heart failure, history of cerebrovascular disease, DM, or renal insufficiency    Routine administration of higher-dose, long-acting metoprolol in beta-blockernaïve patients on the day of surgery, and in the absence of dose titration is associated with an overall increase in mortality. Beta-blockers should be started days to weeks prior to surgery and titrated to pulse < 70.  4. Deep vein thrombosis prophylaxis: regimen to be chosen by surgical team  5.  No contraindications to planned surgery

## 2018-01-15 ENCOUNTER — TELEPHONE (OUTPATIENT)
Dept: CASE MANAGEMENT | Age: 63
End: 2018-01-15

## 2018-01-17 DIAGNOSIS — Z12.11 SCREENING FOR COLON CANCER: Primary | ICD-10-CM

## 2018-03-21 PROBLEM — F10.931 ALCOHOL WITHDRAWAL DELIRIUM (HCC): Status: ACTIVE | Noted: 2018-03-21

## 2018-03-22 PROBLEM — E44.0 MODERATE MALNUTRITION (HCC): Chronic | Status: ACTIVE | Noted: 2018-03-22

## 2018-04-11 RX ORDER — CARVEDILOL 6.25 MG/1
6.25 TABLET ORAL 2 TIMES DAILY
Qty: 60 TABLET | Refills: 2 | Status: SHIPPED | OUTPATIENT
Start: 2018-04-11

## 2018-04-19 DIAGNOSIS — Z12.11 SCREEN FOR COLON CANCER: Primary | ICD-10-CM
